# Patient Record
Sex: FEMALE | Race: WHITE | NOT HISPANIC OR LATINO | Employment: FULL TIME | URBAN - METROPOLITAN AREA
[De-identification: names, ages, dates, MRNs, and addresses within clinical notes are randomized per-mention and may not be internally consistent; named-entity substitution may affect disease eponyms.]

---

## 2023-01-30 PROBLEM — B96.89 BACTERIAL VAGINOSIS: Status: ACTIVE | Noted: 2023-01-30

## 2023-01-30 PROBLEM — N76.0 BACTERIAL VAGINOSIS: Status: ACTIVE | Noted: 2023-01-30

## 2023-01-30 PROBLEM — B02.9 VARICELLA ZOSTER: Status: ACTIVE | Noted: 2023-01-30

## 2023-01-30 PROBLEM — G43.109 MIGRAINE WITH AURA AND WITHOUT STATUS MIGRAINOSUS, NOT INTRACTABLE: Status: ACTIVE | Noted: 2023-01-30

## 2023-01-30 PROBLEM — B37.31 YEAST VAGINITIS: Status: ACTIVE | Noted: 2023-01-30

## 2023-01-30 PROBLEM — N39.0 ACUTE UTI (URINARY TRACT INFECTION): Status: ACTIVE | Noted: 2023-01-30

## 2023-01-30 PROBLEM — R09.81 SINUS CONGESTION: Status: ACTIVE | Noted: 2023-01-30

## 2023-01-30 RX ORDER — CIPROFLOXACIN 250 MG/1
250 TABLET, FILM COATED ORAL EVERY 12 HOURS
COMMUNITY
Start: 2018-11-21 | End: 2023-03-06 | Stop reason: ALTCHOICE

## 2023-01-30 RX ORDER — UBIDECARENONE 75 MG
1 CAPSULE ORAL
COMMUNITY
Start: 2015-07-16

## 2023-01-30 RX ORDER — FLUTICASONE PROPIONATE 50 MCG
1 SPRAY, SUSPENSION (ML) NASAL 2 TIMES DAILY
COMMUNITY
Start: 2018-11-21 | End: 2023-03-06

## 2023-01-30 RX ORDER — CHOLECALCIFEROL (VITAMIN D3) 25 MCG
25 TABLET ORAL
COMMUNITY

## 2023-03-06 ENCOUNTER — OFFICE VISIT (OUTPATIENT)
Dept: PRIMARY CARE | Facility: CLINIC | Age: 53
End: 2023-03-06
Payer: COMMERCIAL

## 2023-03-06 VITALS
BODY MASS INDEX: 30.73 KG/M2 | RESPIRATION RATE: 16 BRPM | DIASTOLIC BLOOD PRESSURE: 82 MMHG | TEMPERATURE: 97.5 F | HEIGHT: 66 IN | HEART RATE: 82 BPM | WEIGHT: 191.2 LBS | SYSTOLIC BLOOD PRESSURE: 156 MMHG

## 2023-03-06 DIAGNOSIS — E55.9 VITAMIN D DEFICIENCY: ICD-10-CM

## 2023-03-06 DIAGNOSIS — Z13.1 SCREENING FOR DIABETES MELLITUS (DM): ICD-10-CM

## 2023-03-06 DIAGNOSIS — Z13.220 LIPID SCREENING: ICD-10-CM

## 2023-03-06 DIAGNOSIS — E53.8 B12 DEFICIENCY: ICD-10-CM

## 2023-03-06 DIAGNOSIS — R03.0 ELEVATED SYSTOLIC BLOOD PRESSURE READING WITHOUT DIAGNOSIS OF HYPERTENSION: Primary | ICD-10-CM

## 2023-03-06 DIAGNOSIS — Z12.31 VISIT FOR SCREENING MAMMOGRAM: ICD-10-CM

## 2023-03-06 DIAGNOSIS — Z12.11 ENCOUNTER FOR SCREENING COLONOSCOPY: ICD-10-CM

## 2023-03-06 PROCEDURE — 99203 OFFICE O/P NEW LOW 30 MIN: CPT | Performed by: FAMILY MEDICINE

## 2023-03-06 NOTE — PATIENT INSTRUCTIONS
High Blood Pressure: Eating Foods Low in Salt: Brief Version    If you have high blood pressure, cutting down on salt can help lower it. Sodium is the part of salt that causes the problems. You should have no more than 2300 milligrams of sodium a day. One teaspoon of salt has about 2300 milligrams of sodium.     Our taste for salt is mainly a habit. When you use less salt, your taste starts to change. After a while, food tastes better without salt than it did with it.     You can use less salt.     There are two main ways to use less salt:     Do not add salt to your foods.  Choose foods that have less salt. Read the labels on canned and prepared foods.      If you need to eat very little salt, you may need help in planning your meals. Talk to your health care provider or dietitian. Remember there are many healthy ways to add taste without adding salt.     You can use less salt by doing these things:     Read labels carefully. Look for any form of salt or sodium (another word for salt). Remember that baking soda, MSG, and baking powder have sodium, too. Do not use foods that have too much sodium.  Add very little or no salt to the food you make.  Do not add salt to food at the table.  Watch what you eat when you eat out. Fast foods are often very high in salt, as are many other restaurant foods.      When cooking or preparing foods, stay away from:     Ketchup, prepared mustard, pickles, and olives.  Soy sauce, steak or barbecue sauce, chili sauce, or Worcestershire sauce.  Bottled salad dressings.  Bouillon cubes.  Self-rising flour and biscuit mixes.      Don't eat foods high in salt, such as:     Cured meats or fish (for example, ackerman, luncheon meats, and canned sardines).  Canned vegetables, soups, and other packaged foods.  Cheeses and buttermilk.  Salted nuts and peanut butter.  Salted crackers, chips, popcorn, and pretzels.  Instant cooked cereals.      You can get many of these foods with no or low salt.  Read the labels. You may also want to try some of the many frozen low-salt and low-fat dinners.     Ask your health care provider about using salt substitutes. Many salt substitutes have potassium. You may need to watch how much potassium you use.     You can learn to cook without using salt.     You can be creative and make food look and taste great. It's a good idea to eat fresh foods as much as you can. Also, plain frozen fruits and vegetables usually do not have added salt.     Instead of salt, there are many kinds of things you can use to flavor your foods. You can try:     Garzon, cilantro, cumin, or paul.  Thyme, matt, bay leaf, or oregano.  Onions, garlic, mushrooms, or tomatoes.  Orange, lemon, lime juice, or wine.      Here are some other great suggestions:     If you use canned products, use the low-salt types. Rinse canned vegetables with tap water before cooking.  Use unsalted margarine instead of regular margarine or butter.  Eat tuna and salmon. Rinse first with running water.      Take care of yourself.     Find out more about eating healthy foods. You can:     Go to the library.  Look at your bookstore for low-salt cookbooks.  Remember to read food labels to find out how much salt and fat are used.      Take time to plan and enjoy your meals. It can be fun to learn to cook new dishes. And it's great to know that when you use less salt, you will lower your blood pressure     You need to be fasting for your labs which means nothing to eat or drink except water for 10 hours or more

## 2023-03-06 NOTE — PROGRESS NOTES
"Subjective   Patient ID: Clarisa De Jesus is a 52 y.o. female who presents for No chief complaint on file..    HPI     Review of Systems    Objective   /82 (BP Location: Right arm, Patient Position: Sitting, BP Cuff Size: Large adult)   Pulse 82   Temp 36.4 °C (97.5 °F) (Temporal)   Resp 16   Ht 1.676 m (5' 6\")   Wt 86.7 kg (191 lb 3.2 oz)   BMI 30.86 kg/m²     Physical Exam    Assessment/Plan   {Assess/PlanSmartLinks:48806}       "

## 2023-03-06 NOTE — PROGRESS NOTES
"Subjective   Patient ID: Clarisa De Jesus is a 52 y.o. female who presents for No chief complaint on file.  She has an elevated systolic blood pressure. Fam hx hypertension    HPI no complaints  needs a colonoscopy    Review of Systems    Objective   /82 (BP Location: Right arm, Patient Position: Sitting, BP Cuff Size: Large adult)   Pulse 82   Temp 36.4 °C (97.5 °F) (Temporal)   Resp 16   Ht 1.676 m (5' 6\")   Wt 86.7 kg (191 lb 3.2 oz)   BMI 30.86 kg/m²     Physical Exam  Vitals and nursing note reviewed.   Constitutional:       General: She is not in acute distress.     Appearance: She is not ill-appearing.   HENT:      Head: Normocephalic and atraumatic.      Mouth/Throat:      Mouth: Mucous membranes are moist.   Eyes:      Conjunctiva/sclera: Conjunctivae normal.   Cardiovascular:      Rate and Rhythm: Normal rate and regular rhythm.      Heart sounds: Normal heart sounds.   Pulmonary:      Effort: Pulmonary effort is normal.      Breath sounds: Normal breath sounds.   Skin:     General: Skin is warm.   Neurological:      Mental Status: She is alert.   Psychiatric:         Mood and Affect: Mood normal.         Thought Content: Thought content normal.         Judgment: Judgment normal.         Assessment/Plan   Diagnoses and all orders for this visit:  Elevated systolic blood pressure reading without diagnosis of hypertension  -     CBC  -     Comprehensive Metabolic Panel  -     TSH with reflex to Free T4 if abnormal  Encounter for screening colonoscopy  -     Colonoscopy; Future  Visit for screening mammogram  -     BI mammo bilateral screening tomosynthesis; Future  Screening for diabetes mellitus (DM)  Lipid screening  -     Lipid Panel  B12 deficiency  -     Vitamin B12  Vitamin D deficiency  -     Vitamin D, Total         "

## 2023-03-06 NOTE — PROGRESS NOTES
"Subjective   Patient ID: Clarisa De Jesus is a 52 y.o. female who presents for No chief complaint on file..    HPI     Review of Systems    Objective   /82 (BP Location: Right arm, Patient Position: Sitting, BP Cuff Size: Large adult)   Pulse 82   Temp 36.4 °C (97.5 °F) (Temporal)   Resp 16   Ht 1.676 m (5' 6\")   Wt 86.7 kg (191 lb 3.2 oz)   BMI 30.86 kg/m²     Physical Exam    Assessment/Plan       Pt in office to meet new pcp.  No complaints.  Would like referral to have a colonoscopy.  Sees GYN yearly     "

## 2023-05-05 ENCOUNTER — APPOINTMENT (OUTPATIENT)
Dept: LAB | Facility: LAB | Age: 53
End: 2023-05-05
Payer: COMMERCIAL

## 2023-05-05 LAB
ALANINE AMINOTRANSFERASE (SGPT) (U/L) IN SER/PLAS: 14 U/L (ref 7–45)
ALBUMIN (G/DL) IN SER/PLAS: 4.1 G/DL (ref 3.4–5)
ALKALINE PHOSPHATASE (U/L) IN SER/PLAS: 92 U/L (ref 33–110)
ANION GAP IN SER/PLAS: 12 MMOL/L (ref 10–20)
ASPARTATE AMINOTRANSFERASE (SGOT) (U/L) IN SER/PLAS: 15 U/L (ref 9–39)
BILIRUBIN TOTAL (MG/DL) IN SER/PLAS: 0.9 MG/DL (ref 0–1.2)
CALCIDIOL (25 OH VITAMIN D3) (NG/ML) IN SER/PLAS: 19 NG/ML
CALCIUM (MG/DL) IN SER/PLAS: 8.9 MG/DL (ref 8.6–10.3)
CARBON DIOXIDE, TOTAL (MMOL/L) IN SER/PLAS: 26 MMOL/L (ref 21–32)
CHLORIDE (MMOL/L) IN SER/PLAS: 104 MMOL/L (ref 98–107)
CHOLESTEROL (MG/DL) IN SER/PLAS: 178 MG/DL (ref 0–199)
CHOLESTEROL IN HDL (MG/DL) IN SER/PLAS: 41.7 MG/DL
CHOLESTEROL/HDL RATIO: 4.3
COBALAMIN (VITAMIN B12) (PG/ML) IN SER/PLAS: 410 PG/ML (ref 211–911)
CREATININE (MG/DL) IN SER/PLAS: 0.82 MG/DL (ref 0.5–1.05)
ERYTHROCYTE DISTRIBUTION WIDTH (RATIO) BY AUTOMATED COUNT: 12.2 % (ref 11.5–14.5)
ERYTHROCYTE MEAN CORPUSCULAR HEMOGLOBIN CONCENTRATION (G/DL) BY AUTOMATED: 33.2 G/DL (ref 32–36)
ERYTHROCYTE MEAN CORPUSCULAR VOLUME (FL) BY AUTOMATED COUNT: 92 FL (ref 80–100)
ERYTHROCYTES (10*6/UL) IN BLOOD BY AUTOMATED COUNT: 4.7 X10E12/L (ref 4–5.2)
GFR FEMALE: 86 ML/MIN/1.73M2
GLUCOSE (MG/DL) IN SER/PLAS: 88 MG/DL (ref 74–99)
HEMATOCRIT (%) IN BLOOD BY AUTOMATED COUNT: 43.4 % (ref 36–46)
HEMOGLOBIN (G/DL) IN BLOOD: 14.4 G/DL (ref 12–16)
LDL: 104 MG/DL (ref 0–99)
LEUKOCYTES (10*3/UL) IN BLOOD BY AUTOMATED COUNT: 7.2 X10E9/L (ref 4.4–11.3)
PLATELETS (10*3/UL) IN BLOOD AUTOMATED COUNT: 357 X10E9/L (ref 150–450)
POTASSIUM (MMOL/L) IN SER/PLAS: 4.3 MMOL/L (ref 3.5–5.3)
PROTEIN TOTAL: 6.5 G/DL (ref 6.4–8.2)
SODIUM (MMOL/L) IN SER/PLAS: 138 MMOL/L (ref 136–145)
THYROTROPIN (MIU/L) IN SER/PLAS BY DETECTION LIMIT <= 0.05 MIU/L: 1.95 MIU/L (ref 0.44–3.98)
TRIGLYCERIDE (MG/DL) IN SER/PLAS: 160 MG/DL (ref 0–149)
UREA NITROGEN (MG/DL) IN SER/PLAS: 11 MG/DL (ref 6–23)
VLDL: 32 MG/DL (ref 0–40)

## 2023-05-05 PROCEDURE — 85027 COMPLETE CBC AUTOMATED: CPT

## 2023-05-05 PROCEDURE — 84443 ASSAY THYROID STIM HORMONE: CPT

## 2023-05-05 PROCEDURE — 82607 VITAMIN B-12: CPT

## 2023-05-05 PROCEDURE — 82306 VITAMIN D 25 HYDROXY: CPT

## 2023-05-05 PROCEDURE — 36415 COLL VENOUS BLD VENIPUNCTURE: CPT

## 2023-05-05 PROCEDURE — 80053 COMPREHEN METABOLIC PANEL: CPT

## 2023-05-05 PROCEDURE — 80061 LIPID PANEL: CPT

## 2023-06-12 ENCOUNTER — OFFICE VISIT (OUTPATIENT)
Dept: PRIMARY CARE | Facility: CLINIC | Age: 53
End: 2023-06-12
Payer: COMMERCIAL

## 2023-06-12 VITALS
DIASTOLIC BLOOD PRESSURE: 78 MMHG | RESPIRATION RATE: 16 BRPM | HEART RATE: 78 BPM | WEIGHT: 191 LBS | BODY MASS INDEX: 30.83 KG/M2 | SYSTOLIC BLOOD PRESSURE: 138 MMHG | TEMPERATURE: 97.2 F

## 2023-06-12 DIAGNOSIS — R03.0 ELEVATED SYSTOLIC BLOOD PRESSURE READING WITHOUT DIAGNOSIS OF HYPERTENSION: Primary | ICD-10-CM

## 2023-06-12 DIAGNOSIS — Z00.00 ROUTINE GENERAL MEDICAL EXAMINATION AT A HEALTH CARE FACILITY: ICD-10-CM

## 2023-06-12 DIAGNOSIS — D25.9 UTERINE LEIOMYOMA, UNSPECIFIED LOCATION: ICD-10-CM

## 2023-06-12 PROBLEM — N39.0 ACUTE UTI (URINARY TRACT INFECTION): Status: RESOLVED | Noted: 2023-01-30 | Resolved: 2023-06-12

## 2023-06-12 PROBLEM — R09.81 SINUS CONGESTION: Status: RESOLVED | Noted: 2023-01-30 | Resolved: 2023-06-12

## 2023-06-12 PROBLEM — N76.0 BACTERIAL VAGINOSIS: Status: RESOLVED | Noted: 2023-01-30 | Resolved: 2023-06-12

## 2023-06-12 PROBLEM — B37.31 YEAST VAGINITIS: Status: RESOLVED | Noted: 2023-01-30 | Resolved: 2023-06-12

## 2023-06-12 PROBLEM — B96.89 BACTERIAL VAGINOSIS: Status: RESOLVED | Noted: 2023-01-30 | Resolved: 2023-06-12

## 2023-06-12 PROCEDURE — 99212 OFFICE O/P EST SF 10 MIN: CPT | Performed by: FAMILY MEDICINE

## 2023-06-12 PROCEDURE — 1036F TOBACCO NON-USER: CPT | Performed by: FAMILY MEDICINE

## 2023-06-12 ASSESSMENT — PATIENT HEALTH QUESTIONNAIRE - PHQ9
2. FEELING DOWN, DEPRESSED OR HOPELESS: NOT AT ALL
SUM OF ALL RESPONSES TO PHQ9 QUESTIONS 1 & 2: 0
1. LITTLE INTEREST OR PLEASURE IN DOING THINGS: NOT AT ALL

## 2023-06-12 NOTE — PROGRESS NOTES
Subjective   Patient ID: Clarisa De Jesus is a 52 y.o. female who presents for Hypertension.    HPI     Review of Systems    Objective   /78 (BP Location: Right arm, Patient Position: Sitting, BP Cuff Size: Adult)   Pulse 78   Temp 36.2 °C (97.2 °F) (Temporal)   Resp 16   Wt 86.6 kg (191 lb)   BMI 30.83 kg/m²     Physical Exam  Vitals and nursing note reviewed.   Constitutional:       General: She is not in acute distress.     Appearance: She is not ill-appearing.   HENT:      Head: Normocephalic and atraumatic.      Mouth/Throat:      Mouth: Mucous membranes are moist.   Eyes:      Conjunctiva/sclera: Conjunctivae normal.   Cardiovascular:      Rate and Rhythm: Normal rate and regular rhythm.      Heart sounds: Normal heart sounds.   Pulmonary:      Effort: Pulmonary effort is normal.      Breath sounds: Normal breath sounds.   Skin:     General: Skin is warm.   Neurological:      Mental Status: She is alert.   Psychiatric:         Mood and Affect: Mood normal.         Thought Content: Thought content normal.         Judgment: Judgment normal.         Assessment/Plan   Problem List Items Addressed This Visit          Genitourinary    Uterine leiomyoma     Seeing gyn          Other Visit Diagnoses       Elevated systolic blood pressure reading without diagnosis of hypertension    -  Primary

## 2024-01-08 ENCOUNTER — HOSPITAL ENCOUNTER (OUTPATIENT)
Dept: RADIOLOGY | Facility: EXTERNAL LOCATION | Age: 54
Discharge: HOME | End: 2024-01-08

## 2024-01-08 DIAGNOSIS — R05.9 COUGH, UNSPECIFIED TYPE: ICD-10-CM

## 2024-04-30 ENCOUNTER — HOSPITAL ENCOUNTER (OUTPATIENT)
Dept: RADIOLOGY | Facility: CLINIC | Age: 54
Discharge: HOME | End: 2024-04-30
Payer: COMMERCIAL

## 2024-04-30 DIAGNOSIS — N85.2 HYPERTROPHY OF UTERUS: ICD-10-CM

## 2024-04-30 PROCEDURE — 76830 TRANSVAGINAL US NON-OB: CPT | Performed by: RADIOLOGY

## 2024-04-30 PROCEDURE — 76856 US EXAM PELVIC COMPLETE: CPT

## 2024-04-30 PROCEDURE — 76856 US EXAM PELVIC COMPLETE: CPT | Performed by: RADIOLOGY

## 2024-10-17 ENCOUNTER — APPOINTMENT (OUTPATIENT)
Dept: PRIMARY CARE | Facility: CLINIC | Age: 54
End: 2024-10-17
Payer: COMMERCIAL

## 2024-11-15 ENCOUNTER — HOSPITAL ENCOUNTER (OUTPATIENT)
Dept: RADIOLOGY | Facility: CLINIC | Age: 54
Discharge: HOME | End: 2024-11-15
Payer: COMMERCIAL

## 2024-11-15 VITALS — WEIGHT: 195 LBS | HEIGHT: 66 IN | BODY MASS INDEX: 31.34 KG/M2

## 2024-11-15 DIAGNOSIS — Z12.31 ENCOUNTER FOR SCREENING MAMMOGRAM FOR MALIGNANT NEOPLASM OF BREAST: ICD-10-CM

## 2024-12-12 ENCOUNTER — HOSPITAL ENCOUNTER (OUTPATIENT)
Dept: RADIOLOGY | Facility: CLINIC | Age: 54
Discharge: HOME | End: 2024-12-12
Payer: COMMERCIAL

## 2024-12-12 VITALS — HEIGHT: 66 IN | WEIGHT: 195 LBS | BODY MASS INDEX: 31.34 KG/M2

## 2024-12-12 PROCEDURE — 77063 BREAST TOMOSYNTHESIS BI: CPT | Performed by: RADIOLOGY

## 2024-12-12 PROCEDURE — 77067 SCR MAMMO BI INCL CAD: CPT | Performed by: RADIOLOGY

## 2024-12-12 PROCEDURE — 77063 BREAST TOMOSYNTHESIS BI: CPT

## 2025-02-04 ENCOUNTER — APPOINTMENT (OUTPATIENT)
Dept: PRIMARY CARE | Facility: CLINIC | Age: 55
End: 2025-02-04
Payer: COMMERCIAL

## 2025-02-04 VITALS
OXYGEN SATURATION: 98 % | DIASTOLIC BLOOD PRESSURE: 80 MMHG | HEART RATE: 94 BPM | RESPIRATION RATE: 18 BRPM | BODY MASS INDEX: 31.98 KG/M2 | TEMPERATURE: 97.2 F | WEIGHT: 199 LBS | SYSTOLIC BLOOD PRESSURE: 160 MMHG | HEIGHT: 66 IN

## 2025-02-04 DIAGNOSIS — Z00.00 HEALTH CARE MAINTENANCE: Primary | ICD-10-CM

## 2025-02-04 DIAGNOSIS — I10 HYPERTENSION, UNSPECIFIED TYPE: ICD-10-CM

## 2025-02-04 PROCEDURE — 3008F BODY MASS INDEX DOCD: CPT | Performed by: INTERNAL MEDICINE

## 2025-02-04 PROCEDURE — 3079F DIAST BP 80-89 MM HG: CPT | Performed by: INTERNAL MEDICINE

## 2025-02-04 PROCEDURE — 1036F TOBACCO NON-USER: CPT | Performed by: INTERNAL MEDICINE

## 2025-02-04 PROCEDURE — 3077F SYST BP >= 140 MM HG: CPT | Performed by: INTERNAL MEDICINE

## 2025-02-04 PROCEDURE — 99396 PREV VISIT EST AGE 40-64: CPT | Performed by: INTERNAL MEDICINE

## 2025-02-04 RX ORDER — HYDROCHLOROTHIAZIDE 25 MG/1
25 TABLET ORAL DAILY
Qty: 30 TABLET | Refills: 5 | Status: SHIPPED | OUTPATIENT
Start: 2025-02-04 | End: 2025-08-03

## 2025-02-04 ASSESSMENT — PATIENT HEALTH QUESTIONNAIRE - PHQ9
2. FEELING DOWN, DEPRESSED OR HOPELESS: NOT AT ALL
1. LITTLE INTEREST OR PLEASURE IN DOING THINGS: NOT AT ALL
SUM OF ALL RESPONSES TO PHQ9 QUESTIONS 1 AND 2: 0

## 2025-02-04 NOTE — PROGRESS NOTES
"Reason for Visit: Annual Physical Exam  Allergies and Medications  Grass pollen, House dust, and Tree and shrub pollen  Current Outpatient Medications   Medication Instructions    cholecalciferol (VITAMIN D-3) 25 mcg    cyanocobalamin (Vitamin B-12) 500 mcg tablet 1 tablet, Every Day     HPI:    54-year-old patient presented to the office today demonstrated for her annual examination.    Patient is known to have history of hypertension she has been trying hard to control it with diet and lifestyle modification she has been under a lot of stress lately and that contributed to her blood pressure being elevated.    She denies any chest pain or shortness of breath not even on exertion no abdominal pain nausea vomiting changes in the bowel habits no blood in the stool no urine symptoms her appetite is good her energy level is adequate.  ROS otherwise negative aside from what was mentioned above in HPI.    Vitals  Pulse 94   Temp 36.2 °C (97.2 °F)   Resp 18   Ht 1.676 m (5' 6\")   Wt 90.3 kg (199 lb)   SpO2 98%   BMI 32.12 kg/m²   Body mass index is 32.12 kg/m².  Physical Exam  Physical Exam  Constitutional:       Appearance: Normal appearance.   HENT:      Head: Normocephalic and atraumatic.   Eyes:      Extraocular Movements: Extraocular movements intact.      Pupils: Pupils are equal, round, and reactive to light.   Cardiovascular:      Rate and Rhythm: Normal rate and regular rhythm.      Pulses: Normal pulses.      Heart sounds: Normal heart sounds.   Pulmonary:      Effort: Pulmonary effort is normal.      Breath sounds: Normal breath sounds.   Chest:      Comments: Breast exam completed no masses asymmetry or discharge.  Abdominal:      General: Abdomen is flat. Bowel sounds are normal.      Palpations: Abdomen is soft.   Neurological:      General: No focal deficit present.      Mental Status: She is alert. Mental status is at baseline.   Psychiatric:         Mood and Affect: Mood normal.         Thought " Content: Thought content normal.         Judgment: Judgment normal.        Active Problem List    Comprehensive Medical/Surgical/Social/Family History  Past Medical History:   Diagnosis Date    Acute upper respiratory infection, unspecified 12/22/2017    Viral URI with cough    Acute upper respiratory infection, unspecified 01/04/2018    Acute upper respiratory infection    Acute UTI (urinary tract infection) 01/30/2023    Bacterial vaginosis 01/30/2023    Other conditions influencing health status 03/21/2016    Low back pain, unspecified back pain laterality, with sciatica presence unspecified    Other conditions influencing health status     No significant past surgical history    Other specified health status     No pertinent past surgical history    Personal history of other diseases of the musculoskeletal system and connective tissue 03/21/2016    History of back pain    Personal history of other endocrine, nutritional and metabolic disease 07/16/2015    History of hyperlipidemia    Personal history of other specified conditions 07/16/2015    History of fatigue    Urinary tract infection, site not specified 03/21/2016    Acute UTI    Yeast vaginitis 01/30/2023     Past Surgical History:   Procedure Laterality Date    OTHER SURGICAL HISTORY  11/08/2018    [place] tooth extraction     Social History     Social History Narrative    Not on file   Patient does not smoke drinks alcohol socially drinks 1 cup of coffee per day she works in human resources she has 2 kids in good health.  Family History   Problem Relation Name Age of Onset    Hyperlipidemia Mother      Hypertension Mother      AVM Father          Brain    Cataracts Maternal Grandmother      Diabetes Maternal Grandmother      Glaucoma Maternal Grandmother      Lung cancer Paternal Grandmother            Assessment and Plan:  Problem List Items Addressed This Visit    None  54-year-old patient with the following issues.    1.  Hypertension at this point we  are going to start hydrochlorothiazide 25 mg p.o. once daily and I will see the patient back in the office in 8 to 10 weeks with blood pressure readings and she was encouraged to bring her machine to compare and confirm accuracy.    2.  Healthcare maintenance issues lab work is requested patient is up-to-date on mammogram Pap smear and colonoscopy.    No other active issues or concerns during this visit I will see the patient back in the office in 1 year for repeat physical in 8 to 10 weeks for follow-up

## 2025-03-07 LAB
ALBUMIN SERPL-MCNC: 4.2 G/DL (ref 3.6–5.1)
ALP SERPL-CCNC: 90 U/L (ref 37–153)
ALT SERPL-CCNC: 16 U/L (ref 6–29)
ANION GAP SERPL CALCULATED.4IONS-SCNC: 10 MMOL/L (CALC) (ref 7–17)
AST SERPL-CCNC: 15 U/L (ref 10–35)
BILIRUB SERPL-MCNC: 0.8 MG/DL (ref 0.2–1.2)
BUN SERPL-MCNC: 13 MG/DL (ref 7–25)
CALCIUM SERPL-MCNC: 9.1 MG/DL (ref 8.6–10.4)
CHLORIDE SERPL-SCNC: 103 MMOL/L (ref 98–110)
CHOLEST SERPL-MCNC: 195 MG/DL
CHOLEST/HDLC SERPL: 4.1 (CALC)
CO2 SERPL-SCNC: 26 MMOL/L (ref 20–32)
CREAT SERPL-MCNC: 0.72 MG/DL (ref 0.5–1.03)
EGFRCR SERPLBLD CKD-EPI 2021: 99 ML/MIN/1.73M2
ERYTHROCYTE [DISTWIDTH] IN BLOOD BY AUTOMATED COUNT: 11.8 % (ref 11–15)
GLUCOSE SERPL-MCNC: 95 MG/DL (ref 65–99)
HCT VFR BLD AUTO: 44.6 % (ref 35–45)
HDLC SERPL-MCNC: 47 MG/DL
HGB BLD-MCNC: 15 G/DL (ref 11.7–15.5)
LDLC SERPL CALC-MCNC: 122 MG/DL (CALC)
MCH RBC QN AUTO: 30.5 PG (ref 27–33)
MCHC RBC AUTO-ENTMCNC: 33.6 G/DL (ref 32–36)
MCV RBC AUTO: 90.7 FL (ref 80–100)
NONHDLC SERPL-MCNC: 148 MG/DL (CALC)
PLATELET # BLD AUTO: 383 THOUSAND/UL (ref 140–400)
PMV BLD REES-ECKER: 10.5 FL (ref 7.5–12.5)
POTASSIUM SERPL-SCNC: 4 MMOL/L (ref 3.5–5.3)
PROT SERPL-MCNC: 6.7 G/DL (ref 6.1–8.1)
RBC # BLD AUTO: 4.92 MILLION/UL (ref 3.8–5.1)
SODIUM SERPL-SCNC: 139 MMOL/L (ref 135–146)
TRIGL SERPL-MCNC: 148 MG/DL
TSH SERPL-ACNC: 1.66 MIU/L
WBC # BLD AUTO: 6.3 THOUSAND/UL (ref 3.8–10.8)

## 2025-04-08 ENCOUNTER — APPOINTMENT (OUTPATIENT)
Dept: PRIMARY CARE | Facility: CLINIC | Age: 55
End: 2025-04-08
Payer: COMMERCIAL

## 2025-04-08 VITALS
TEMPERATURE: 98.1 F | SYSTOLIC BLOOD PRESSURE: 119 MMHG | BODY MASS INDEX: 31.82 KG/M2 | OXYGEN SATURATION: 98 % | HEIGHT: 66 IN | HEART RATE: 66 BPM | DIASTOLIC BLOOD PRESSURE: 71 MMHG | RESPIRATION RATE: 18 BRPM | WEIGHT: 198 LBS

## 2025-04-08 DIAGNOSIS — R11.0 NAUSEA: ICD-10-CM

## 2025-04-08 DIAGNOSIS — Z13.6 SCREENING FOR CARDIOVASCULAR CONDITION: Primary | ICD-10-CM

## 2025-04-08 DIAGNOSIS — R10.9 ABDOMINAL CRAMPING: ICD-10-CM

## 2025-04-08 PROCEDURE — 99214 OFFICE O/P EST MOD 30 MIN: CPT | Performed by: INTERNAL MEDICINE

## 2025-04-08 PROCEDURE — 3008F BODY MASS INDEX DOCD: CPT | Performed by: INTERNAL MEDICINE

## 2025-04-08 PROCEDURE — 1036F TOBACCO NON-USER: CPT | Performed by: INTERNAL MEDICINE

## 2025-04-08 ASSESSMENT — PATIENT HEALTH QUESTIONNAIRE - PHQ9
1. LITTLE INTEREST OR PLEASURE IN DOING THINGS: NOT AT ALL
SUM OF ALL RESPONSES TO PHQ9 QUESTIONS 1 AND 2: 0
2. FEELING DOWN, DEPRESSED OR HOPELESS: NOT AT ALL

## 2025-04-08 NOTE — PROGRESS NOTES
Subjective   Patient ID: [id number]     Clarisa De Jesus is a 54 y.o. female who presents for Follow-up (Htn meds).    Current Outpatient Medications:     cholecalciferol (Vitamin D-3) 25 MCG (1000 UT) tablet, Take 1 tablet (25 mcg) by mouth., Disp: , Rfl:     cyanocobalamin (Vitamin B-12) 500 mcg tablet, Take 1 tablet (500 mcg) by mouth once every day., Disp: , Rfl:     hydroCHLOROthiazide (HYDRODiuril) 25 mg tablet, Take 1 tablet (25 mg) by mouth once daily., Disp: 30 tablet, Rfl: 5  HPI  54-year-old patient presented to the office today for scheduled follow-up last time I saw the patient in my office we started her on hydrochlorothiazide for hypertension she has been compliant.  Recording her blood pressure readings, I am a bit concerned her home machine overestimates so she is going to buy a bigger cough but she is going to bring her home machine back for follow-up within the next 2 weeks.  She denies any chest pain dizziness or lightheadedness.    She lately noticed some right upper quadrant abdominal discomfort off-and-on very vague dull aching nagging in nature worse after eating no nausea no vomiting no fever chills or rigors no bowel movement changes.  Review of system was reviewed all normal except what is noted in HPI   Past Medical History:   Diagnosis Date    Acute upper respiratory infection, unspecified 12/22/2017    Viral URI with cough    Acute upper respiratory infection, unspecified 01/04/2018    Acute upper respiratory infection    Acute UTI (urinary tract infection) 01/30/2023    Bacterial vaginosis 01/30/2023    Other conditions influencing health status 03/21/2016    Low back pain, unspecified back pain laterality, with sciatica presence unspecified    Other conditions influencing health status     No significant past surgical history    Other specified health status     No pertinent past surgical history    Personal history of other diseases of the musculoskeletal system and connective tissue  "03/21/2016    History of back pain    Personal history of other endocrine, nutritional and metabolic disease 07/16/2015    History of hyperlipidemia    Personal history of other specified conditions 07/16/2015    History of fatigue    Urinary tract infection, site not specified 03/21/2016    Acute UTI    Yeast vaginitis 01/30/2023      Objective   /71   Pulse 66   Temp 36.7 °C (98.1 °F)   Resp 18   Ht 1.676 m (5' 6\")   Wt 89.8 kg (198 lb)   SpO2 98%   BMI 31.96 kg/m²      Physical Exam  Constitutional:       Appearance: Normal appearance.   Cardiovascular:      Rate and Rhythm: Normal rate and regular rhythm.      Pulses: Normal pulses.      Heart sounds: Normal heart sounds.   Pulmonary:      Effort: Pulmonary effort is normal.      Breath sounds: Normal breath sounds.   Neurological:      Mental Status: She is alert.         Assessment/Plan   Problem List Items Addressed This Visit    None    54-year-old patient with the following issues.    1.  Hypertension patient blood pressure is excellent continue with the current medication no changes we are going to have her come back within 2 weeks bring her home machine to calibrate and confirm accuracy.    2.  Right upper quadrant abdominal discomfort vague dull aching nagging in nature and worse after eating ultrasound will be obtained her liver function test was just completed and it was completely normal but we will look at ultrasound results and we will discuss the results with her once available.      3.  Mild hyperlipidemia we are going to proceed with coronary calcium score to risk stratify and we will discuss treatment if necessary.    No other active issues or concerns during this visit I will see the patient back in the office previously scheduled and sooner if needed  Bianca Rodriguez MD   "

## 2025-04-10 ENCOUNTER — HOSPITAL ENCOUNTER (OUTPATIENT)
Dept: RADIOLOGY | Facility: CLINIC | Age: 55
Discharge: HOME | End: 2025-04-10
Payer: COMMERCIAL

## 2025-04-10 DIAGNOSIS — R11.0 NAUSEA: ICD-10-CM

## 2025-04-10 DIAGNOSIS — R10.9 ABDOMINAL CRAMPING: ICD-10-CM

## 2025-04-10 PROCEDURE — 76700 US EXAM ABDOM COMPLETE: CPT

## 2025-04-17 ENCOUNTER — APPOINTMENT (OUTPATIENT)
Dept: PRIMARY CARE | Facility: CLINIC | Age: 55
End: 2025-04-17
Payer: COMMERCIAL

## 2025-04-17 VITALS
SYSTOLIC BLOOD PRESSURE: 120 MMHG | HEIGHT: 66 IN | RESPIRATION RATE: 18 BRPM | DIASTOLIC BLOOD PRESSURE: 80 MMHG | HEART RATE: 86 BPM | BODY MASS INDEX: 31.18 KG/M2 | WEIGHT: 194 LBS | OXYGEN SATURATION: 98 %

## 2025-04-17 DIAGNOSIS — K76.0 HEPATIC STEATOSIS: Primary | ICD-10-CM

## 2025-04-17 PROBLEM — D25.9 UTERINE LEIOMYOMA: Status: RESOLVED | Noted: 2023-06-12 | Resolved: 2025-04-17

## 2025-04-17 PROCEDURE — 99214 OFFICE O/P EST MOD 30 MIN: CPT | Performed by: INTERNAL MEDICINE

## 2025-04-17 PROCEDURE — 1036F TOBACCO NON-USER: CPT | Performed by: INTERNAL MEDICINE

## 2025-04-17 PROCEDURE — 3008F BODY MASS INDEX DOCD: CPT | Performed by: INTERNAL MEDICINE

## 2025-04-17 ASSESSMENT — PATIENT HEALTH QUESTIONNAIRE - PHQ9
2. FEELING DOWN, DEPRESSED OR HOPELESS: NOT AT ALL
SUM OF ALL RESPONSES TO PHQ9 QUESTIONS 1 AND 2: 0
1. LITTLE INTEREST OR PLEASURE IN DOING THINGS: NOT AT ALL

## 2025-04-17 NOTE — PROGRESS NOTES
"Subjective   Patient ID: [id number]     Clarisa De Jesus is a 54 y.o. female who presents for Follow-up (Check pt brought bp machines from home).  Current Medications[1]  HPI  54-year-old patient presented to the office today for follow-up visit on her blood pressure, she did bring her 2 blood pressure machine to compare and confirm accuracy one of them is accurate other old instructed to use the accurate machine.  Her blood pressure reading has been excellent she has no concerns she feels a lot better she feels like she has energy and she is exercising more no other complaints or concerns.  Review of system was reviewed all normal except what is noted in [facility]   Medical History[2]   Objective   /80   Pulse 86   Resp 18   Ht 1.676 m (5' 6\")   Wt 88 kg (194 lb)   SpO2 98%   BMI 31.31 kg/m²      Physical Exam  Constitutional:       Appearance: Normal appearance.   Cardiovascular:      Rate and Rhythm: Normal rate and regular rhythm.      Pulses: Normal pulses.      Heart sounds: Normal heart sounds.   Pulmonary:      Effort: Pulmonary effort is normal.      Breath sounds: Normal breath sounds.   Neurological:      Mental Status: She is alert.         Assessment/Plan   Problem List Items Addressed This Visit    None  54-year-old patient with the following issues.    1.  Essential benign hypertension, patient's blood pressure is adequate continue with current medication no changes.    2.  Evidence of hepatic steatosis on ultrasound patient liver function test is completely normal patient is a bit concerned I instructed her to follow a low-cholesterol diet increase her activity and achieve weight loss and she was interested in meeting with liver specialist referral was provided.    No other active issues or concerns during this visit I will see the patient back in the office as previously scheduled and sooner if needed.    Bianca Rodriguez MD        [1]   Current Outpatient Medications:     cholecalciferol (Vitamin " D-3) 25 MCG (1000 UT) tablet, Take 1 tablet (25 mcg) by mouth., Disp: , Rfl:     cyanocobalamin (Vitamin B-12) 500 mcg tablet, Take 1 tablet (500 mcg) by mouth once every day., Disp: , Rfl:     hydroCHLOROthiazide (HYDRODiuril) 25 mg tablet, Take 1 tablet (25 mg) by mouth once daily., Disp: 30 tablet, Rfl: 5    multivitamin-Ca-iron-minerals (Tab-A-Briana Womens) 27-0.4 mg tablet, Take 1 tablet by mouth once daily., Disp: , Rfl:   [2]   Past Medical History:  Diagnosis Date    Acute upper respiratory infection, unspecified 12/22/2017    Viral URI with cough    Acute upper respiratory infection, unspecified 01/04/2018    Acute upper respiratory infection    Acute UTI (urinary tract infection) 01/30/2023    Bacterial vaginosis 01/30/2023    Other conditions influencing health status 03/21/2016    Low back pain, unspecified back pain laterality, with sciatica presence unspecified    Other conditions influencing health status     No significant past surgical history    Other specified health status     No pertinent past surgical history    Personal history of other diseases of the musculoskeletal system and connective tissue 03/21/2016    History of back pain    Personal history of other endocrine, nutritional and metabolic disease 07/16/2015    History of hyperlipidemia    Personal history of other specified conditions 07/16/2015    History of fatigue    Urinary tract infection, site not specified 03/21/2016    Acute UTI    Yeast vaginitis 01/30/2023

## 2025-05-01 ENCOUNTER — APPOINTMENT (OUTPATIENT)
Dept: PRIMARY CARE | Facility: CLINIC | Age: 55
End: 2025-05-01
Payer: COMMERCIAL

## 2025-06-04 DIAGNOSIS — I10 HYPERTENSION, UNSPECIFIED TYPE: ICD-10-CM

## 2025-06-04 RX ORDER — HYDROCHLOROTHIAZIDE 25 MG/1
25 TABLET ORAL DAILY
Qty: 90 TABLET | Refills: 1 | Status: SHIPPED | OUTPATIENT
Start: 2025-06-04

## 2025-06-19 ASSESSMENT — ENCOUNTER SYMPTOMS
APPETITE CHANGE: 0
CONSTIPATION: 0
HEADACHES: 0
BLOOD IN STOOL: 0
HEMATURIA: 0
DIARRHEA: 0
PALPITATIONS: 0
WHEEZING: 0
DIFFICULTY URINATING: 0
CHILLS: 0
FEVER: 0
AGITATION: 0
BRUISES/BLEEDS EASILY: 0
FREQUENCY: 0
COUGH: 0
CONFUSION: 0
TREMORS: 0
TROUBLE SWALLOWING: 0
ABDOMINAL PAIN: 0
COLOR CHANGE: 0
NAUSEA: 0
LIGHT-HEADEDNESS: 0
UNEXPECTED WEIGHT CHANGE: 0
DYSURIA: 0
JOINT SWELLING: 0
SHORTNESS OF BREATH: 0
DIZZINESS: 0
WEAKNESS: 0
ABDOMINAL DISTENTION: 0
NUMBNESS: 0
SLEEP DISTURBANCE: 0
VOICE CHANGE: 0
VOMITING: 0

## 2025-06-20 ENCOUNTER — OFFICE VISIT (OUTPATIENT)
Dept: GASTROENTEROLOGY | Facility: CLINIC | Age: 55
End: 2025-06-20
Payer: COMMERCIAL

## 2025-06-20 VITALS
HEART RATE: 96 BPM | DIASTOLIC BLOOD PRESSURE: 84 MMHG | SYSTOLIC BLOOD PRESSURE: 132 MMHG | HEIGHT: 66 IN | TEMPERATURE: 98.6 F | BODY MASS INDEX: 31.63 KG/M2 | WEIGHT: 196.8 LBS

## 2025-06-20 DIAGNOSIS — K76.0 HEPATIC STEATOSIS: ICD-10-CM

## 2025-06-20 PROCEDURE — 3008F BODY MASS INDEX DOCD: CPT | Performed by: NURSE PRACTITIONER

## 2025-06-20 PROCEDURE — 1036F TOBACCO NON-USER: CPT | Performed by: NURSE PRACTITIONER

## 2025-06-20 PROCEDURE — 99212 OFFICE O/P EST SF 10 MIN: CPT | Performed by: NURSE PRACTITIONER

## 2025-06-20 PROCEDURE — 99203 OFFICE O/P NEW LOW 30 MIN: CPT | Performed by: NURSE PRACTITIONER

## 2025-06-20 RX ORDER — LANOLIN ALCOHOL/MO/W.PET/CERES
1000 CREAM (GRAM) TOPICAL
COMMUNITY
Start: 2024-07-26

## 2025-06-20 ASSESSMENT — PAIN SCALES - GENERAL: PAINLEVEL_OUTOF10: 0-NO PAIN

## 2025-06-20 NOTE — ASSESSMENT & PLAN NOTE
54 year old with US showing hepatic steatosis in the setting of HTN.  Trying to lose weight.  Normal liver enzymes.    Discussed natural history of fatty liver including risk factors and management.  Exercise/Activity  Vigorous physical activity like brisk walking or structured gym exercises 3 times a week for 30 minutes at minimum.    Resistance training to build muscle mass which will aid in weight loss.  Swimming, water aerobics are excellent forms of exercises that are easy on joints.    Exercise for 30 minutes or more at least 3 times a week  Aim to lose 7-10% of your total body weight over 6-12 months  Diet  Avoid/Limit simple carbs including simple sugars  Avoid eating foods that are rich in sugar in excess such as high sugar content fruits (pineapple, naveed...) and desserts, cakes and pies  Eat more good foods that are rich in good fat such as cold water fish (salmon, swordfish...) as well as avocados and peanut butter in moderation  Snack on nuts that are high in protein and good oils such as walnuts, almonds.   Eat a mediteranean diet which is rich in grilled lean meats, high protein beans and legumes and olive oil.          Will complete fibroscan to grade and stage fatty liver.  Based on these findings, will determine follow-up and treatment course, which could include new drug for TAYLOR.

## 2025-06-20 NOTE — PROGRESS NOTES
Patient ID: Clarisa De Jesus is a 54 y.o. female who presents in consultation hepatic steatosis.    HPI  54 year old with history of HTN referred for hepatic steatosis.    Pt underwent imaging for RUQ pain:   US of abdomen was done 04/10/25:  hepatic steatosis.   Labs are normal.    No family history of liver disease.  Family history of HTN, HLD and CAD.    Weight is stable, having a hard time losing weight.  Social ETOH-a couple times per month.  Does not drink at home.    Denies jaundice, icterus, itching, abdominal distension, edema, bleeding or confusion.    Denies fevers, chills, abdominal pain.       Review of Systems   Constitutional:  Negative for appetite change, chills, fever and unexpected weight change.   HENT:  Negative for mouth sores, nosebleeds, trouble swallowing and voice change.    Eyes:  Negative for visual disturbance.   Respiratory:  Negative for cough, shortness of breath and wheezing.    Cardiovascular:  Negative for chest pain, palpitations and leg swelling.   Gastrointestinal:  Negative for abdominal distention, abdominal pain, blood in stool, constipation, diarrhea, nausea and vomiting.   Genitourinary:  Negative for decreased urine volume, difficulty urinating, dysuria, frequency, hematuria and urgency.   Musculoskeletal:  Negative for gait problem and joint swelling.   Skin:  Negative for color change, pallor and rash.   Neurological:  Negative for dizziness, tremors, weakness, light-headedness, numbness and headaches.   Hematological:  Does not bruise/bleed easily.   Psychiatric/Behavioral:  Negative for agitation, behavioral problems, confusion and sleep disturbance.        Objective   Physical Exam  Constitutional:       General: She is awake.      Appearance: Normal appearance. She is well-developed.   HENT:      Head: Normocephalic and atraumatic.      Right Ear: Hearing normal.      Left Ear: Hearing normal.      Nose: Nose normal.      Mouth/Throat:      Lips: Pink.      Mouth: Mucous  membranes are moist.   Eyes:      General: Lids are normal.      Extraocular Movements: Extraocular movements intact.      Conjunctiva/sclera: Conjunctivae normal.      Pupils: Pupils are equal, round, and reactive to light.   Cardiovascular:      Rate and Rhythm: Normal rate and regular rhythm.      Pulses: Normal pulses.      Heart sounds: Normal heart sounds.   Pulmonary:      Effort: Pulmonary effort is normal.      Breath sounds: Normal breath sounds.   Abdominal:      General: Abdomen is flat. Bowel sounds are normal.      Palpations: Abdomen is soft.   Musculoskeletal:      Cervical back: Normal range of motion and neck supple.   Feet:      Right foot:      Skin integrity: Skin integrity normal.      Left foot:      Skin integrity: Skin integrity normal.   Skin:     General: Skin is warm.   Neurological:      General: No focal deficit present.      Mental Status: She is alert and oriented to person, place, and time.      Cranial Nerves: Cranial nerves 2-12 are intact.      Sensory: Sensation is intact.      Motor: Motor function is intact.      Coordination: Coordination is intact.      Gait: Gait is intact.   Psychiatric:         Attention and Perception: Attention and perception normal.         Mood and Affect: Mood normal.         Speech: Speech normal.         Behavior: Behavior is cooperative.         Thought Content: Thought content normal.         Cognition and Memory: Cognition normal.         Judgment: Judgment normal.       Current Medications[1]  LABS:   Lab Results   Component Value Date    ALBUMIN 4.2 03/06/2025    BILITOT 0.8 03/06/2025    ALKPHOS 90 03/06/2025    ALT 16 03/06/2025    AST 15 03/06/2025    PROT 6.7 03/06/2025      Lab Results   Component Value Date    WBC 6.3 03/06/2025    HGB 15.0 03/06/2025    HCT 44.6 03/06/2025    MCV 90.7 03/06/2025     03/06/2025       === 04/10/25 ===    US ABDOMEN COMPLETE    - Impression -  Hepatic steatosis.    Otherwise, grossly unremarkable  ultrasound of the abdomen.    MACRO:  None    Signed by: Constantin Delaney 4/10/2025 3:38 PM  Dictation workstation:   [id number]     Assessment/Plan   Problem List Items Addressed This Visit           ICD-10-CM    Hepatic steatosis K76.0    54 year old with US showing hepatic steatosis in the setting of HTN.  Trying to lose weight.  Normal liver enzymes.    Discussed natural history of fatty liver including risk factors and management.  Exercise/Activity  Vigorous physical activity like brisk walking or structured gym exercises 3 times a week for 30 minutes at minimum.    Resistance training to build muscle mass which will aid in weight loss.  Swimming, water aerobics are excellent forms of exercises that are easy on joints.    Exercise for 30 minutes or more at least 3 times a week  Aim to lose 7-10% of your total body weight over 6-12 months  Diet  Avoid/Limit simple carbs including simple sugars  Avoid eating foods that are rich in sugar in excess such as high sugar content fruits (pineapple, naveed...) and desserts, cakes and pies  Eat more good foods that are rich in good fat such as cold water fish (salmon, swordfish...) as well as avocados and peanut butter in moderation  Snack on nuts that are high in protein and good oils such as walnuts, almonds.   Eat a mediteranean diet which is rich in grilled lean meats, high protein beans and legumes and olive oil.          Will complete fibroscan to grade and stage fatty liver.  Based on these findings, will determine follow-up and treatment course, which could include new drug for TAYLOR.              Relevant Orders    Iron and TIBC    Ferritin    CBC and Auto Differential    Comprehensive Metabolic Panel    Hemochromatosis Mutation Analysis    Liver Elastography (Fibroscan)            COTY Nathan 06/19/25 8:09 PM              [1]   Current Outpatient Medications   Medication Sig Dispense Refill    cholecalciferol (Vitamin D-3) 25 MCG (1000 UT) tablet Take  1 tablet (25 mcg) by mouth.      cyanocobalamin (Vitamin B-12) 500 mcg tablet Take 1 tablet (500 mcg) by mouth once every day.      hydroCHLOROthiazide (HYDRODiuril) 25 mg tablet TAKE 1 TABLET BY MOUTH EVERY DAY 90 tablet 1    multivitamin-Ca-iron-minerals (Tab-A-Briana Womens) 27-0.4 mg tablet Take 1 tablet by mouth once daily.       No current facility-administered medications for this visit.

## 2025-07-30 ENCOUNTER — OFFICE VISIT (OUTPATIENT)
Dept: PRIMARY CARE | Facility: CLINIC | Age: 55
End: 2025-07-30
Payer: COMMERCIAL

## 2025-07-30 VITALS
RESPIRATION RATE: 16 BRPM | HEIGHT: 66 IN | SYSTOLIC BLOOD PRESSURE: 120 MMHG | TEMPERATURE: 98 F | BODY MASS INDEX: 31.37 KG/M2 | OXYGEN SATURATION: 99 % | HEART RATE: 99 BPM | DIASTOLIC BLOOD PRESSURE: 80 MMHG | WEIGHT: 195.2 LBS

## 2025-07-30 DIAGNOSIS — L25.9 CONTACT DERMATITIS, UNSPECIFIED CONTACT DERMATITIS TYPE, UNSPECIFIED TRIGGER: Primary | ICD-10-CM

## 2025-07-30 PROCEDURE — 99213 OFFICE O/P EST LOW 20 MIN: CPT | Performed by: NURSE PRACTITIONER

## 2025-07-30 PROCEDURE — 1036F TOBACCO NON-USER: CPT | Performed by: NURSE PRACTITIONER

## 2025-07-30 PROCEDURE — 3008F BODY MASS INDEX DOCD: CPT | Performed by: NURSE PRACTITIONER

## 2025-07-30 RX ORDER — TRIAMCINOLONE ACETONIDE 1 MG/G
OINTMENT TOPICAL 2 TIMES DAILY PRN
Qty: 30 G | Refills: 1 | Status: SHIPPED | OUTPATIENT
Start: 2025-07-30 | End: 2025-11-27

## 2025-07-30 ASSESSMENT — ENCOUNTER SYMPTOMS
COLOR CHANGE: 0
WOUND: 0

## 2025-07-30 NOTE — PROGRESS NOTES
"Subjective   Patient ID: Clarisa De Jesus is a 54 y.o. female who presents for Rash (Pt is here due to rash on forearm,  pt ).    HPI    Subjective   Clarisa De Jesus is a 54 y.o. female who presents for evaluation of a rash involving the B/L arms. Rash started 2 weeks ago. Lesions are red, and raised in texture. Rash has not changed over time. Rash is pruritic. Associated symptoms: none. Patient denies: congestion, cough, fever, headache, nausea, and sore throat. Patient has not had contacts with similar rash. Patient has not had new exposures (soaps, lotions, laundry detergents, foods, medications, plants, insects or animals).      Review of Systems   Skin:  Positive for rash. Negative for color change, pallor and wound.   All other systems reviewed and are negative.      Objective   /80 (BP Location: Left arm, Patient Position: Sitting, BP Cuff Size: Large adult)   Pulse 99   Temp 36.7 °C (98 °F) (Temporal)   Resp 16   Ht 1.676 m (5' 6\")   Wt 88.5 kg (195 lb 3.2 oz)   SpO2 99%   BMI 31.51 kg/m²     Physical Exam  Vitals reviewed.   Constitutional:       Appearance: Normal appearance. She is normal weight.     Eyes:      Conjunctiva/sclera: Conjunctivae normal.       Cardiovascular:      Rate and Rhythm: Normal rate and regular rhythm.      Heart sounds: Normal heart sounds.   Pulmonary:      Effort: Pulmonary effort is normal.      Breath sounds: Normal breath sounds.     Musculoskeletal:         General: Normal range of motion.      Cervical back: Neck supple.     Skin:     General: Skin is warm and dry.      Findings: Rash (Maculopapular rash to dorsal aspect of B/L forearm.) present.     Neurological:      General: No focal deficit present.      Mental Status: She is alert and oriented to person, place, and time.     Psychiatric:         Mood and Affect: Mood normal.         Assessment/Plan   Assessment & Plan  Contact dermatitis, unspecified contact dermatitis type, unspecified trigger    Orders:    " triamcinolone (Kenalog) 0.1 % ointment; Apply topically 2 times a day as needed for irritation or rash.    -Suspect dermatitis.   -Apply steroid cream as directed.   -Keep area clean and dry.   -Follow up with derm if no improvement in 1 week.   -Call or return to office as needed if these symptoms worsen or fail to improve as anticipated.

## 2026-02-05 ENCOUNTER — APPOINTMENT (OUTPATIENT)
Dept: PRIMARY CARE | Facility: CLINIC | Age: 56
End: 2026-02-05
Payer: COMMERCIAL